# Patient Record
Sex: MALE | Race: WHITE
[De-identification: names, ages, dates, MRNs, and addresses within clinical notes are randomized per-mention and may not be internally consistent; named-entity substitution may affect disease eponyms.]

---

## 2017-11-24 ENCOUNTER — HOSPITAL ENCOUNTER (OUTPATIENT)
Dept: HOSPITAL 47 - EC | Age: 52
Setting detail: OBSERVATION
Discharge: LEFT BEFORE DISCHARGE | End: 2017-11-24
Payer: COMMERCIAL

## 2017-11-24 VITALS — DIASTOLIC BLOOD PRESSURE: 57 MMHG | SYSTOLIC BLOOD PRESSURE: 121 MMHG | HEART RATE: 78 BPM | TEMPERATURE: 97.4 F

## 2017-11-24 VITALS — RESPIRATION RATE: 18 BRPM

## 2017-11-24 DIAGNOSIS — E66.9: ICD-10-CM

## 2017-11-24 DIAGNOSIS — Z93.3: ICD-10-CM

## 2017-11-24 DIAGNOSIS — E11.9: ICD-10-CM

## 2017-11-24 DIAGNOSIS — R53.1: ICD-10-CM

## 2017-11-24 DIAGNOSIS — Z79.899: ICD-10-CM

## 2017-11-24 DIAGNOSIS — E78.00: ICD-10-CM

## 2017-11-24 DIAGNOSIS — R07.89: Primary | ICD-10-CM

## 2017-11-24 DIAGNOSIS — Z88.0: ICD-10-CM

## 2017-11-24 DIAGNOSIS — Z79.84: ICD-10-CM

## 2017-11-24 DIAGNOSIS — Z88.5: ICD-10-CM

## 2017-11-24 DIAGNOSIS — F32.9: ICD-10-CM

## 2017-11-24 DIAGNOSIS — Z79.4: ICD-10-CM

## 2017-11-24 DIAGNOSIS — Z85.048: ICD-10-CM

## 2017-11-24 DIAGNOSIS — Z53.21: ICD-10-CM

## 2017-11-24 DIAGNOSIS — F41.9: ICD-10-CM

## 2017-11-24 DIAGNOSIS — R06.02: ICD-10-CM

## 2017-11-24 LAB
ALP SERPL-CCNC: 95 U/L (ref 38–126)
ALT SERPL-CCNC: 49 U/L (ref 21–72)
ANION GAP SERPL CALC-SCNC: 11 MMOL/L
APTT BLD: 23.4 SEC (ref 22–30)
AST SERPL-CCNC: 19 U/L (ref 17–59)
BASOPHILS # BLD AUTO: 0.1 K/UL (ref 0–0.2)
BASOPHILS NFR BLD AUTO: 1 %
BUN SERPL-SCNC: 14 MG/DL (ref 9–20)
CALCIUM SPEC-MCNC: 8.9 MG/DL (ref 8.4–10.2)
CH: 29.1
CHCM: 33.9
CHLORIDE SERPL-SCNC: 103 MMOL/L (ref 98–107)
CK SERPL-CCNC: 48 U/L (ref 55–170)
CO2 SERPL-SCNC: 24 MMOL/L (ref 22–30)
EOSINOPHIL # BLD AUTO: 0.2 K/UL (ref 0–0.7)
EOSINOPHIL NFR BLD AUTO: 3 %
ERYTHROCYTE [DISTWIDTH] IN BLOOD BY AUTOMATED COUNT: 5.9 M/UL (ref 4.3–5.9)
ERYTHROCYTE [DISTWIDTH] IN BLOOD: 14 % (ref 11.5–15.5)
GLUCOSE BLD-MCNC: 184 MG/DL (ref 75–99)
GLUCOSE SERPL-MCNC: 230 MG/DL (ref 74–99)
HCT VFR BLD AUTO: 51 % (ref 39–53)
HDW: 2.81
HGB BLD-MCNC: 17.2 GM/DL (ref 13–17.5)
INR PPP: 1 (ref ?–1.2)
LUC NFR BLD AUTO: 1 %
LYMPHOCYTES # SPEC AUTO: 1.6 K/UL (ref 1–4.8)
LYMPHOCYTES NFR SPEC AUTO: 21 %
MAGNESIUM SPEC-SCNC: 2.2 MG/DL (ref 1.6–2.3)
MCH RBC QN AUTO: 29.1 PG (ref 25–35)
MCHC RBC AUTO-ENTMCNC: 33.7 G/DL (ref 31–37)
MCV RBC AUTO: 86.4 FL (ref 80–100)
MONOCYTES # BLD AUTO: 0.4 K/UL (ref 0–1)
MONOCYTES NFR BLD AUTO: 5 %
NEUTROPHILS # BLD AUTO: 5.2 K/UL (ref 1.3–7.7)
NEUTROPHILS NFR BLD AUTO: 69 %
NON-AFRICAN AMERICAN GFR(MDRD): >60
POTASSIUM SERPL-SCNC: 4.5 MMOL/L (ref 3.5–5.1)
PROT SERPL-MCNC: 7.3 G/DL (ref 6.3–8.2)
PT BLD: 10.2 SEC (ref 9–12)
SODIUM SERPL-SCNC: 138 MMOL/L (ref 137–145)
TROPONIN I SERPL-MCNC: <0.012 NG/ML (ref 0–0.03)
WBC # BLD AUTO: 0.1 10*3/UL
WBC # BLD AUTO: 7.6 K/UL (ref 3.8–10.6)
WBC (PEROX): 7.47

## 2017-11-24 PROCEDURE — 80053 COMPREHEN METABOLIC PANEL: CPT

## 2017-11-24 PROCEDURE — 93005 ELECTROCARDIOGRAM TRACING: CPT

## 2017-11-24 PROCEDURE — 83735 ASSAY OF MAGNESIUM: CPT

## 2017-11-24 PROCEDURE — 85610 PROTHROMBIN TIME: CPT

## 2017-11-24 PROCEDURE — 71020: CPT

## 2017-11-24 PROCEDURE — 85025 COMPLETE CBC W/AUTO DIFF WBC: CPT

## 2017-11-24 PROCEDURE — 96374 THER/PROPH/DIAG INJ IV PUSH: CPT

## 2017-11-24 PROCEDURE — 99291 CRITICAL CARE FIRST HOUR: CPT

## 2017-11-24 PROCEDURE — 84484 ASSAY OF TROPONIN QUANT: CPT

## 2017-11-24 PROCEDURE — 82550 ASSAY OF CK (CPK): CPT

## 2017-11-24 PROCEDURE — 85730 THROMBOPLASTIN TIME PARTIAL: CPT

## 2017-11-24 PROCEDURE — 82553 CREATINE MB FRACTION: CPT

## 2017-11-24 PROCEDURE — 36415 COLL VENOUS BLD VENIPUNCTURE: CPT

## 2017-11-24 NOTE — XR
EXAMINATION TYPE: XR chest 2V

 

DATE OF EXAM: 11/24/2017

 

COMPARISON: NONE

 

HISTORY: Chest pain for 3 days and abnormal EKG.

 

TECHNIQUE:  Frontal and lateral views of the chest are obtained.

 

FINDINGS:  There is no focal air space opacity, pleural effusion, or pneumothorax seen.  The cardiac 
silhouette size is upper limits of normal size. The osseous structures are intact. Mild multilevel de
generative changes of the thoracic spine are noted.

 

IMPRESSION:  No acute cardiopulmonary process.

## 2017-11-24 NOTE — ED
General Adult HPI





- General


Chief complaint: Chest Pain


Stated complaint: chest pain/sent by doctor


Time Seen by Provider: 11/24/17 12:08


Source: patient, RN notes reviewed, old records reviewed


Mode of arrival: wheelchair


Limitations: no limitations





- History of Present Illness


Initial comments: 





This is a 52-year-old male to the ER for evaluation regarding chest pain.  

Patient having anterior chest pain chest pain the left-sided chest pain the 

left jaw.  Patient has history of diabetes high cholesterol.  No prior history 

of heart disease.  Patient coming in with continued chest pain or shortness of 

breath at this time, weakness fatigue.  Worse with activity.  No recent travel 

history no sick contacts no fevers cough or congestion





- Related Data


 Home Medications











 Medication  Instructions  Recorded  Confirmed


 


Atorvastatin [Lipitor] 10 mg PO HS 10/22/14 11/24/17


 


Insulin Glulisine [Apidra] 20 unit SQ TID 10/22/14 11/24/17


 


Losartan [Cozaar] 50 mg PO DAILY 10/22/14 11/24/17


 


Dapagliflozin Propanediol [Farxiga] 10 mg PO DAILY 11/24/17 11/24/17


 


Escitalopram [Lexapro] 10 mg PO DAILY 11/24/17 11/24/17


 


HYDROcodone/APAP 7.5-325MG [Norco 1 tab PO Q6H PRN 11/24/17 11/24/17





7.5-325]   


 


Insulin Glargine,Hum.rec.anlog 110 units SQ DAILY 11/24/17 11/24/17





[Toujeo Solostar]   











 Allergies











Allergy/AdvReac Type Severity Reaction Status Date / Time


 


meperidine HCl [From Demerol] Allergy Unknown Nausea & Verified 11/24/17 13:05





   Vomiting  


 


Penicillins Allergy Unknown Unknown Verified 11/24/17 13:05





   Childhood  














Review of Systems


ROS Statement: 


Those systems with pertinent positive or pertinent negative responses have been 

documented in the HPI.





ROS Other: All systems not noted in ROS Statement are negative.





Past Medical History


Past Medical History: Cancer, Diabetes Mellitus, Hyperlipidemia


Additional Past Medical History / Comment(s): HX OF RECTAL CA(AGE 28), HAS 

COLOSTOMY, WHICH HE STATES IS HERNIATED. nerve damage to legs


History of Any Multi-Drug Resistant Organisms: None Reported


Past Surgical History: Back Surgery, Bowel Resection, Orthopedic Surgery


Additional Past Surgical History / Comment(s): DAREN KNEE, RT ROTATOR CUFF, 

COLOSTOMY (2007), REPAIR OF HERNIATED COLOSTOMY (2013)


Past Anesthesia/Blood Transfusion Reactions: Postoperative Nausea & Vomiting (

PONV)


Past Psychological History: Anxiety, Depression


Smoking Status: Never smoker


Past Alcohol Use History: None Reported


Past Drug Use History: None Reported





General Exam


Limitations: no limitations


General appearance: alert, in no apparent distress, obese


Head exam: Present: atraumatic, normocephalic, normal inspection


Eye exam: Present: normal appearance, PERRL, EOMI.  Absent: scleral icterus, 

conjunctival injection, periorbital swelling


ENT exam: Present: normal exam, mucous membranes moist


Neck exam: Present: normal inspection.  Absent: tenderness, meningismus, 

lymphadenopathy


Respiratory exam: Present: normal lung sounds bilaterally.  Absent: respiratory 

distress, wheezes, rales, rhonchi, stridor


Cardiovascular Exam: Present: regular rate, normal rhythm, normal heart sounds.

  Absent: systolic murmur, diastolic murmur, rubs, gallop, clicks


GI/Abdominal exam: Present: soft, normal bowel sounds.  Absent: distended, 

tenderness, guarding, rebound, rigid


Extremities exam: Present: normal inspection, full ROM, normal capillary 

refill.  Absent: tenderness, pedal edema, joint swelling, calf tenderness


Back exam: Present: normal inspection


Neurological exam: Present: alert, oriented X3, CN II-XII intact


Psychiatric exam: Present: normal affect, normal mood


Skin exam: Present: warm, dry, intact, normal color.  Absent: rash





Course


 Vital Signs











  11/24/17 11/24/17





  11:58 13:23


 


Temperature 97.1 F L 


 


Pulse Rate 75 73


 


Respiratory 18 17





Rate  


 


Blood Pressure 139/83 124/72


 


O2 Sat by Pulse 99 96





Oximetry  














EKG Findings





- EKG Comments:


EKG Findings:: EKG shows normal sinus rhythm rate of 71, , , QTc 

447





Medical Decision Making





- Medical Decision Making





52 male to ER for evaluation of chest pain.  Patient will be admitted for chest 

pain observation, anticoagulation, cardiac evaluation





- Lab Data


Result diagrams: 


 11/24/17 12:15





 11/24/17 12:15


 Lab Results











  11/24/17 11/24/17 11/24/17 Range/Units





  12:15 12:15 12:15 


 


WBC   7.6   (3.8-10.6)  k/uL


 


RBC   5.90   (4.30-5.90)  m/uL


 


Hgb   17.2   (13.0-17.5)  gm/dL


 


Hct   51.0   (39.0-53.0)  %


 


MCV   86.4   (80.0-100.0)  fL


 


MCH   29.1   (25.0-35.0)  pg


 


MCHC   33.7   (31.0-37.0)  g/dL


 


RDW   14.0   (11.5-15.5)  %


 


Plt Count   218   (150-450)  k/uL


 


Neutrophils %   69   %


 


Lymphocytes %   21   %


 


Monocytes %   5   %


 


Eosinophils %   3   %


 


Basophils %   1   %


 


Neutrophils #   5.2   (1.3-7.7)  k/uL


 


Lymphocytes #   1.6   (1.0-4.8)  k/uL


 


Monocytes #   0.4   (0-1.0)  k/uL


 


Eosinophils #   0.2   (0-0.7)  k/uL


 


Basophils #   0.1   (0-0.2)  k/uL


 


PT     (9.0-12.0)  sec


 


INR     (<1.2)  


 


APTT     (22.0-30.0)  sec


 


Sodium    138  (137-145)  mmol/L


 


Potassium    4.5  (3.5-5.1)  mmol/L


 


Chloride    103  ()  mmol/L


 


Carbon Dioxide    24  (22-30)  mmol/L


 


Anion Gap    11  mmol/L


 


BUN    14  (9-20)  mg/dL


 


Creatinine    0.80  (0.66-1.25)  mg/dL


 


Est GFR (MDRD) Af Amer    >60  (>60 ml/min/1.73 sqM)  


 


Est GFR (MDRD) Non-Af    >60  (>60 ml/min/1.73 sqM)  


 


Glucose    230 H  (74-99)  mg/dL


 


Calcium    8.9  (8.4-10.2)  mg/dL


 


Magnesium    2.2  (1.6-2.3)  mg/dL


 


Total Bilirubin    0.7  (0.2-1.3)  mg/dL


 


AST    19  (17-59)  U/L


 


ALT    49  (21-72)  U/L


 


Alkaline Phosphatase    95  ()  U/L


 


Total Creatine Kinase  48 L    ()  U/L


 


CK-MB (CK-2)  0.5    (0.0-2.4)  ng/mL


 


CK-MB (CK-2) Rel Index  1.0    


 


Troponin I  <0.012    (0.000-0.034)  ng/mL


 


Total Protein    7.3  (6.3-8.2)  g/dL


 


Albumin    4.2  (3.5-5.0)  g/dL














  11/24/17 Range/Units





  12:15 


 


WBC   (3.8-10.6)  k/uL


 


RBC   (4.30-5.90)  m/uL


 


Hgb   (13.0-17.5)  gm/dL


 


Hct   (39.0-53.0)  %


 


MCV   (80.0-100.0)  fL


 


MCH   (25.0-35.0)  pg


 


MCHC   (31.0-37.0)  g/dL


 


RDW   (11.5-15.5)  %


 


Plt Count   (150-450)  k/uL


 


Neutrophils %   %


 


Lymphocytes %   %


 


Monocytes %   %


 


Eosinophils %   %


 


Basophils %   %


 


Neutrophils #   (1.3-7.7)  k/uL


 


Lymphocytes #   (1.0-4.8)  k/uL


 


Monocytes #   (0-1.0)  k/uL


 


Eosinophils #   (0-0.7)  k/uL


 


Basophils #   (0-0.2)  k/uL


 


PT  10.2  (9.0-12.0)  sec


 


INR  1.0  (<1.2)  


 


APTT  23.4  (22.0-30.0)  sec


 


Sodium   (137-145)  mmol/L


 


Potassium   (3.5-5.1)  mmol/L


 


Chloride   ()  mmol/L


 


Carbon Dioxide   (22-30)  mmol/L


 


Anion Gap   mmol/L


 


BUN   (9-20)  mg/dL


 


Creatinine   (0.66-1.25)  mg/dL


 


Est GFR (MDRD) Af Amer   (>60 ml/min/1.73 sqM)  


 


Est GFR (MDRD) Non-Af   (>60 ml/min/1.73 sqM)  


 


Glucose   (74-99)  mg/dL


 


Calcium   (8.4-10.2)  mg/dL


 


Magnesium   (1.6-2.3)  mg/dL


 


Total Bilirubin   (0.2-1.3)  mg/dL


 


AST   (17-59)  U/L


 


ALT   (21-72)  U/L


 


Alkaline Phosphatase   ()  U/L


 


Total Creatine Kinase   ()  U/L


 


CK-MB (CK-2)   (0.0-2.4)  ng/mL


 


CK-MB (CK-2) Rel Index   


 


Troponin I   (0.000-0.034)  ng/mL


 


Total Protein   (6.3-8.2)  g/dL


 


Albumin   (3.5-5.0)  g/dL














- Radiology Data


Radiology results: report reviewed (Chest x-ray is negative for acute disease), 

image reviewed





Critical Care Time


Critical Care Time: Yes


Total Critical Care Time: 31





Disposition


Clinical Impression: 


 Chest pain





Disposition: ADMITTED AS IP TO THIS HOSP


Condition: Undetermined


Instructions:  Chest Pain (ED)


Referrals: 


German Chaparro DO [Primary Care Provider] - 1-2 days

## 2017-11-25 NOTE — HP
HISTORY AND PHYSICAL



CHIEF COMPLAINT:

Chest pain.



HISTORY OF PRESENT ILLNESS:

This 52-year-old male was admitted through the emergency room to the floor.  Shortly

after he got the floor, he signed out against medical advice.  There is no further

history taken or physical performed.



He is admitted to the hospital with diagnosis of chest pain.



PLAN:

Bed rest, IVs and serial EKGs and enzymes.  He signed out AMA.





FAUSTINA / JUSTINE: [id number] / Job#: [id number]

## 2017-11-26 NOTE — DS
DISCHARGE SUMMARY



CHIEF COMPLAINT:

Chest pain.



HISTORY OF PRESENT ILLNESS AND PHYSICAL EXAM.:

There was no history taken or physical performed.  Patient signed out against medical

advice.



FINAL DIAGNOSIS:

Chest pain.



OPERATIONS:

None.



CONSULTATION:

None. He signed out AMA.





FAUSTINA / JUSTINE: [id number] / Job#: [id number]

## 2018-01-06 ENCOUNTER — HOSPITAL ENCOUNTER (EMERGENCY)
Dept: HOSPITAL 47 - EC | Age: 53
Discharge: HOME | End: 2018-01-06
Payer: MEDICARE

## 2018-01-06 VITALS
RESPIRATION RATE: 20 BRPM | TEMPERATURE: 97.8 F | SYSTOLIC BLOOD PRESSURE: 150 MMHG | HEART RATE: 78 BPM | DIASTOLIC BLOOD PRESSURE: 73 MMHG

## 2018-01-06 DIAGNOSIS — F32.9: ICD-10-CM
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
## 2018-01-06 DIAGNOSIS — Z79.82: ICD-10-CM

## 2018-01-06 DIAGNOSIS — Z79.4: ICD-10-CM

## 2018-01-06 DIAGNOSIS — M19.90: ICD-10-CM

## 2018-01-06 DIAGNOSIS — Z85.048: ICD-10-CM

## 2018-01-06 DIAGNOSIS — Z79.899: ICD-10-CM

## 2018-01-06 DIAGNOSIS — E11.9: ICD-10-CM

## 2018-01-06 DIAGNOSIS — Z88.0: ICD-10-CM

## 2018-01-06 DIAGNOSIS — F41.9: ICD-10-CM
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
## 2018-01-06 DIAGNOSIS — Z88.5: ICD-10-CM

## 2018-01-06 DIAGNOSIS — S30.810A: Primary | ICD-10-CM

## 2018-01-06 DIAGNOSIS — I10: ICD-10-CM

## 2018-01-06 PROCEDURE — 99283 EMERGENCY DEPT VISIT LOW MDM: CPT

## 2018-01-06 NOTE — ED
Back Pain HPI





- General


Chief Complaint: Back Pain/Injury


Stated Complaint: FALL


Time Seen by Provider: 18 19:53


Source: patient


Limitations: no limitations





- History of Present Illness


Initial Comments: 


Patient is a 52-year-old male who presents with a chief complaint of fall and 

back injury.  [facility] he [facility] on 

the escalator.  The patient states that when he was going down the escalator, 

his right arm caught the railing on the stairs that were next to the escalator 

and lifted him up and he fell.  The patient states that he scratched his back 

on the edges of the escalator step.  The patient was immediately evaluated by 

medical staff at the [facility].  After that, the patient continued to [place] and 

later decided to come to the emergency department in [place] as he lives 

here.  On initial evaluation, the patient states that he just wanted to get 

checked out.  He does not have any spinal tenderness, his gait is at baseline.








- Related Data


 Home Medications











 Medication  Instructions  Recorded  Confirmed


 


Atorvastatin [Lipitor] 10 mg PO DAILY 10/22/14 01/06/18


 


Insulin Glulisine [Apidra] 20 unit SQ TID 10/22/14 01/06/18


 


Losartan [Cozaar] 50 mg PO HS 10/22/14 01/06/18


 


Escitalopram [Lexapro] 10 mg PO HS 17


 


Insulin Glargine,Hum.rec.anlog 110 units SQ DAILY 17





[Toujeo Solostar]   


 


Aspirin EC [Ecotrin Low Dose] 81 mg PO DAILY 18


 


Empagliflozin [Jardiance] 10 mg PO DAILY 18


 


Insulin Aspart [NovoLOG 20 unit SQ AC-TID 18





(formulary)]   


 


Insulin Aspart [NovoLOG See Protocol SQ AC-TID 18





(formulary)]   











 Allergies











Allergy/AdvReac Type Severity Reaction Status Date / Time


 


meperidine HCl [From Demerol] Allergy Unknown Nausea & Verified 18 19:57





   Vomiting  


 


Penicillins Allergy Unknown Unknown Verified 18 19:57





   Childhood  














Review of Systems


ROS Statement: 


Those systems with pertinent positive or pertinent negative responses have been 

documented in the HPI.





ROS Other: All systems not noted in ROS Statement are negative.


Skin: Reports: lesions





Past Medical History


Past Medical History: Cancer, Diabetes Mellitus, Hypertension, Osteoarthritis (

OA), Pneumonia


Additional Past Medical History / Comment(s): Rectal cancer with surgery-

resection/colostomy and then stoma hernia repair-has current colostomy hernia, 

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 

IDDM type II, bronchitis, arthritis bilateral hands, occasional low back pain, 

past R hand fracture.


History of Any Multi-Drug Resistant Organisms: None Reported


Past Surgical History: Back Surgery, Bowel Resection, Orthopedic Surgery


Additional Past Surgical History / Comment(s):  AP resection,  resection

/colostomy,  repair stoma hernia, large cyst removed from low back, R 

rotator cuff surgery, multiple colonoscopies, R knee arthroscopy, L hydocele/

small tumor removed, fatty tumors removed from back, 2 benign tumors removed 

from R groin.


Past Anesthesia/Blood Transfusion Reactions: Postoperative Nausea & Vomiting (

PONV)


Past Psychological History: Anxiety, Depression


Smoking Status: Never smoker


Past Alcohol Use History: None Reported


Past Drug Use History: None Reported





- Past Family History


  ** Mother


Family Medical History: Myocardial Infarction (MI)


Additional Family Medical History / Comment(s): Pt states mother  at the 

age of 51 yrs from MI/aortic aneurysm rupture.





  ** Father


Family Medical History: No Reported History


Additional Family Medical History / Comment(s): Father is 78yrs old and healthy.





General Exam


Limitations: no limitations


General appearance: alert, in no apparent distress


Head exam: Present: atraumatic, normocephalic


Eye exam: Present: normal appearance


ENT exam: Present: mucous membranes moist


Neck exam: Absent: tenderness


Respiratory exam: Present: normal lung sounds bilaterally


Cardiovascular Exam: Present: regular rate, normal rhythm


GI/Abdominal exam: Present: soft


Extremities exam: Present: other (Patient has a very small abrasion to the 

right distal dorsal aspect of his calf).  Absent: pedal edema


Back exam: Present: normal inspection


Neurological exam: Present: alert, oriented X3


Psychiatric exam: Present: normal affect, normal mood


Skin exam: Present: abrasion (Patient has 4 linear abrasions to the right 

paraspinal aspect of his lower back.  There is no bleeding.)





Course





 Vital Signs











  18





  19:46


 


Temperature 97.8 F


 


Pulse Rate 78


 


Respiratory 20





Rate 


 


Blood Pressure 150/73


 


O2 Sat by Pulse 98





Oximetry 














Medical Decision Making





- Medical Decision Making


Patient presents with a chief complaint of a fall on an escalator.  The patient 

states that he scratched his skin on his back on the escalator steps.  He is up-

to-date on his tetanus.  Patient is able to gait normally.  He is not having 

significant pain.  He was offered x-rays but declines as he would not like to 

be exposed to radiation because he has had colon cancer before.  At this time, 

the patient will have antibiotic ointment applied to the area will be 

discharged.  He was instructed to follow-up with primary care or return to the 

emergency department if symptoms worsen or change.








Disposition


Clinical Impression: 


 Abrasion





Disposition: HOME SELF-CARE


Condition: Good


Instructions:  Abrasion (ED)


Referrals: 


German Chaparro DO [Primary Care Provider] - 1-2 days

## 2018-08-23 ENCOUNTER — HOSPITAL ENCOUNTER (EMERGENCY)
Dept: HOSPITAL 47 - EC | Age: 53
Discharge: HOME | End: 2018-08-23
Payer: COMMERCIAL

## 2018-08-23 VITALS
TEMPERATURE: 98 F | DIASTOLIC BLOOD PRESSURE: 70 MMHG | RESPIRATION RATE: 18 BRPM | SYSTOLIC BLOOD PRESSURE: 120 MMHG | HEART RATE: 70 BPM

## 2018-08-23 DIAGNOSIS — Z85.048: ICD-10-CM

## 2018-08-23 DIAGNOSIS — I10: ICD-10-CM

## 2018-08-23 DIAGNOSIS — M25.531: Primary | ICD-10-CM

## 2018-08-23 DIAGNOSIS — Z79.4: ICD-10-CM

## 2018-08-23 DIAGNOSIS — F32.9: ICD-10-CM

## 2018-08-23 DIAGNOSIS — M19.041: ICD-10-CM

## 2018-08-23 DIAGNOSIS — E11.9: ICD-10-CM

## 2018-08-23 DIAGNOSIS — Z88.0: ICD-10-CM
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
## 2018-08-23 DIAGNOSIS — Z88.5: ICD-10-CM

## 2018-08-23 DIAGNOSIS — M79.644: ICD-10-CM

## 2018-08-23 DIAGNOSIS — Z79.899: ICD-10-CM

## 2018-08-23 DIAGNOSIS — M79.89: ICD-10-CM

## 2018-08-23 DIAGNOSIS — Z93.3: ICD-10-CM

## 2018-08-23 DIAGNOSIS — F41.9: ICD-10-CM

## 2018-08-23 DIAGNOSIS — M19.042: ICD-10-CM

## 2018-08-23 DIAGNOSIS — Z98.890: ICD-10-CM

## 2018-08-23 DIAGNOSIS — Z79.82: ICD-10-CM

## 2018-08-23 PROCEDURE — 29125 APPL SHORT ARM SPLINT STATIC: CPT

## 2018-08-23 PROCEDURE — 99283 EMERGENCY DEPT VISIT LOW MDM: CPT

## 2018-08-23 NOTE — ED
Upper Extremity HPI





- General


Chief Complaint: Extremity Injury, Upper


Stated Complaint: right hand pain


Time Seen by Provider: 18 15:19


Source: patient, RN notes reviewed


Mode of arrival: ambulatory


Limitations: no limitations





- History of Present Illness


Initial Comments: 


This is a 53-year-old male who presents to the emergency department with chief 

complaint of right hand injury.  Patient reports falling yesterday on an 

outstretched hand.  He reports pain to the joint of his thumb and down into the 

proximal hand.  States he has difficulty flexing the finger.  Reports normal 

sensation.  Denies any other injuries or trauma. Denies fever, chills, chest 

pain, shortness of breath, numbness or tingling, headache or vision changes.  








- Related Data


 Home Medications











 Medication  Instructions  Recorded  Confirmed


 


Atorvastatin [Lipitor] 10 mg PO DAILY 10/22/14 01/06/18


 


Insulin Glulisine [Apidra] 20 unit SQ TID 10/22/14 01/06/18


 


Losartan [Cozaar] 50 mg PO HS 10/22/14 01/06/18


 


Escitalopram [Lexapro] 10 mg PO HS 17


 


Insulin Glargine,Hum.rec.anlog 110 units SQ DAILY 17





[Toujeo Solostar]   


 


Aspirin EC [Ecotrin Low Dose] 81 mg PO DAILY 18


 


Empagliflozin [Jardiance] 10 mg PO DAILY 18


 


Insulin Aspart [NovoLOG 20 unit SQ AC-TID 18





(formulary)]   


 


Insulin Aspart [NovoLOG See Protocol SQ AC-TID 18





(formulary)]   











 Allergies











Allergy/AdvReac Type Severity Reaction Status Date / Time


 


meperidine HCl [From Demerol] Allergy Unknown Nausea & Verified 18 15:49





   Vomiting  


 


Penicillins Allergy Unknown Unknown Verified 18 15:49





   Childhood  














Review of Systems


ROS Statement: 


Those systems with pertinent positive or pertinent negative responses have been 

documented in the HPI.





ROS Other: All systems not noted in ROS Statement are negative.





Past Medical History


Past Medical History: Cancer, Diabetes Mellitus, Hypertension, Osteoarthritis (

OA), Pneumonia


Additional Past Medical History / Comment(s): Rectal cancer with surgery-

resection/colostomy and then stoma hernia repair-has current colostomy hernia, 

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 

IDDM type II, bronchitis, arthritis bilateral hands, occasional low back pain, 

past R hand fracture.


History of Any Multi-Drug Resistant Organisms: None Reported


Past Surgical History: Back Surgery, Bowel Resection, Orthopedic Surgery


Additional Past Surgical History / Comment(s):  AP resection,  resection

/colostomy,  repair stoma hernia, large cyst removed from low back, R 

rotator cuff surgery, multiple colonoscopies, R knee arthroscopy, L hydocele/

small tumor removed, fatty tumors removed from back, 2 benign tumors removed 

from R groin.


Past Anesthesia/Blood Transfusion Reactions: Postoperative Nausea & Vomiting (

PONV)


Past Psychological History: Anxiety, Depression


Smoking Status: Never smoker


Past Alcohol Use History: None Reported


Past Drug Use History: None Reported





- Past Family History


  ** Mother


Family Medical History: Myocardial Infarction (MI)


Additional Family Medical History / Comment(s): Pt states mother  at the 

age of 51 yrs from MI/aortic aneurysm rupture.





  ** Father


Family Medical History: No Reported History


Additional Family Medical History / Comment(s): Father is 78yrs old and healthy.





General Exam





- General Exam Comments


Initial Comments: 





General: Awake and alert, well-developed; in no apparent distress.


HEENT: Head atraumatic, normocephalic. Pupils are equal, round and reactive to 

light. Extraocular movements intact. Oropharynx moist without erythema or 

exudate. 


Neck: Supple. Normal ROM. 


Cardiovascular: Regular rate and rhythm. No murmurs, rubs or gallops. Chest 

symmetrical.  


Respiratory: Lungs clear to auscultation bilaterally. No wheezes, rales or 

rhonchi. Normal respiratory effort with no use of accessory muscles. 


Musculoskeletal: Limited active range of motion of the IP and MCP joints of the 

right thumb.  Passive range of motion is intact.  Tenderness on palpation of 

the MCP joint and anatomic snuffbox.  Soft tissue swelling along radial aspect 

right wrist. No ecchymosis, erythema noted.  Sensation is intact.  Radial 

pulses are 2+ equal and palpable bilaterally.  Ambulating with a cane.


Skin: Pink, warm and dry without rashes or lesions. 


Neurological: Alert and oriented x3. CN II-XII grossly intact. Speech is fluent 

and answers are appropriate. No focal neuro deficits. 


Psychiatric: Normal mood and affect. No overt signs of depression or anxiety 

noted. 











Limitations: no limitations





Course


 Vital Signs











  18





  13:54 15:38


 


Temperature 98.4 F 98 F


 


Pulse Rate 74 70


 


Respiratory 16 18





Rate  


 


Blood Pressure 142/74 120/70


 


O2 Sat by Pulse 96 98





Oximetry  














Procedures





- Orthopedic Splinting/Casting


  ** Injury #1


Side: right


Upper Extremity Injury Location: wrist


Upper Extremity Immobilizer: thumb spica, synthetic pre-padded splint





Medical Decision Making





- Medical Decision Making


This is a 53-year-old male who presents to the emergency department with chief 

complaint of right hand injury.  Patient reports falling on outstretched hand 

yesterday.  On physical examination, patient has limited active range of motion 

of the right thumb joints due to pain.  There is anatomic snuffbox tenderness 

and tenderness at the MCP joint.  Short arm OCL thumb spica splint was placed 

and patient tolerated well without complication. He is neurovascularly intact.  

Recommended following up with orthopedics for repeat x-rays.  Recommended rest, 

ice and Tylenol or ibuprofen as needed.  Patient is in agreement with plan and 

voices understanding.  Vital signs are stable and he is in no acute distress.  

He will be discharged with this time.  All questions answered.








- Radiology Data


Radiology results: report reviewed, image reviewed





X-ray right hand findings: Bone mineralization and joint spaces are maintained.

  There is some volar angulation of the fifth metacarpal, cortical thickening 

is present suggestive of old healed boxer's fracture. Remodeling present at the 

radiocarpal joint likely due to underlying arthropathy.  Question bony density 

at the proximal aspect of the first metacarpal seen on the lateral exam, 

correlate for point tenderness.  There is soft tissue swelling.


Impression: No dislocation.  Findings at the fifth and first metacarpals as 

described.  Correlate for point tenderness.





Disposition


Clinical Impression: 


 Right wrist pain





Disposition: HOME SELF-CARE


Condition: Good


Instructions:  Wrist Injury (ED)


Additional Instructions: 


Please follow up with [facility] within 1-2 days.  Please keep 

splint clean, dry and intact.  Please rest, ice and take ibuprofen or Tylenol 

as needed for pain. Please follow up with primary care provider within 1-2 

days. Return to emergency department if symptoms should worsen or any concerns 

arise. 


Is patient prescribed a controlled substance at d/c from ED?: No


Referrals: 


German Chaparro DO [Primary Care Provider] - 1-2 days


Rafy Dumont MD [Medical Doctor] - 1-2 days


Time of Disposition: 15:50

## 2018-08-23 NOTE — XR
Right hand

 

HISTORY: Right hand pain, trauma

 

3 views of the right hand

 

Bone mineralization and joint spaces are maintained. There is some volar angulation of the fifth meta
carpal, cortical thickening is present suggestive of old healed boxer's fracture. Remodeling present 
at the radiocarpal joint likely due to underlying arthropathy. Question bony density at the proximal 
aspect of the first metacarpal seen on the lateral exam, correlate for point tenderness. There is sof
t tissue swelling.

 

IMPRESSION: No dislocation. Findings at the first and fifth metacarpals as described. Correlate for p
oint tenderness.

## 2019-07-07 ENCOUNTER — HOSPITAL ENCOUNTER (EMERGENCY)
Dept: HOSPITAL 47 - EC | Age: 54
Discharge: HOME | End: 2019-07-07
Payer: COMMERCIAL

## 2019-07-07 VITALS
TEMPERATURE: 98 F | RESPIRATION RATE: 18 BRPM | HEART RATE: 74 BPM | DIASTOLIC BLOOD PRESSURE: 87 MMHG | SYSTOLIC BLOOD PRESSURE: 144 MMHG

## 2019-07-07 DIAGNOSIS — Z85.048: ICD-10-CM

## 2019-07-07 DIAGNOSIS — Z88.5: ICD-10-CM

## 2019-07-07 DIAGNOSIS — Z93.3: ICD-10-CM

## 2019-07-07 DIAGNOSIS — I10: ICD-10-CM

## 2019-07-07 DIAGNOSIS — F41.9: ICD-10-CM
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
## 2019-07-07 DIAGNOSIS — E11.9: ICD-10-CM

## 2019-07-07 DIAGNOSIS — Z79.4: ICD-10-CM

## 2019-07-07 DIAGNOSIS — Z53.20: ICD-10-CM
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
## 2019-07-07 DIAGNOSIS — Z79.899: ICD-10-CM

## 2019-07-07 DIAGNOSIS — F32.9: ICD-10-CM

## 2019-07-07 DIAGNOSIS — Z88.0: ICD-10-CM

## 2019-07-07 DIAGNOSIS — S20.211A: Primary | ICD-10-CM

## 2019-07-07 PROCEDURE — 99283 EMERGENCY DEPT VISIT LOW MDM: CPT

## 2019-07-07 NOTE — ED
Fall HPI





- General


Chief Complaint: Fall


Stated Complaint: Fall


Time Seen by Provider: 19 15:04


Source: patient, RN notes reviewed


Mode of arrival: ambulatory


Limitations: no limitations





- History of Present Illness


Initial Comments: 





54-year-old male presents emergency Department chief complaint of a fall.  

Patient states that he is out of back of a chair trying to lift it up to get 

onto the tongue of the truck.  Patient states it started rolling he fell off 

onto his right side of his chest.  He has no headache denies any head injury no 

loss conscious.  Denies any extremity injury.  Patient states he only has right 

anterior rib pain.  Patient has pain with deep inspiration.  Patient does not 

feel short of breath at rest denies any nausea vomiting.  Patient denies any 

other complaints.  Patient does not request any pain meds.





- Related Data


                                Home Medications











 Medication  Instructions  Recorded  Confirmed


 


Atorvastatin [Lipitor] 10 mg PO DAILY 10/22/14 08/23/18


 


Insulin Glulisine [Apidra] 20 unit SQ TID 10/22/14 08/23/18


 


Losartan [Cozaar] 50 mg PO HS 10/22/14 08/23/18


 


Escitalopram [Lexapro] 10 mg PO HS 17


 


Insulin Glargine,Hum.rec.anlog 110 units SQ DAILY 17





[Toujeo Solostar]   


 


Acetaminophen [Tylenol] 325 mg PO Q4H PRN 18











                                    Allergies











Allergy/AdvReac Type Severity Reaction Status Date / Time


 


meperidine HCl [From Demerol] Allergy Unknown Nausea & Verified 19 15:01





   Vomiting  


 


Penicillins Allergy Unknown Unknown Verified 19 15:01





   Childhood  














Review of Systems


ROS Statement: 


Those systems with pertinent positive or pertinent negative responses have been 

documented in the HPI.





ROS Other: All systems not noted in ROS Statement are negative.





Past Medical History


Past Medical History: Cancer, Diabetes Mellitus, Hypertension, Osteoarthritis 

(OA), Pneumonia


Additional Past Medical History / Comment(s): Rectal cancer with surgery-

resection/colostomy and then stoma hernia repair-has current colostomy hernia, 

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

type II, bronchitis, arthritis bilateral hands, occasional low back pain, past R

hand fracture.


History of Any Multi-Drug Resistant Organisms: None Reported


Past Surgical History: Back Surgery, Bowel Resection, Orthopedic Surgery


Additional Past Surgical History / Comment(s):  AP resection,  

resection/colostomy, 2013 repair stoma hernia, large cyst removed from low back,

R rotator cuff surgery, multiple colonoscopies, R knee arthroscopy, L 

hydocele/small tumor removed, fatty tumors removed from back, 2 benign tumors 

removed from R groin.


Past Anesthesia/Blood Transfusion Reactions: Postoperative Nausea & Vomiting 

(PONV)


Past Psychological History: Anxiety, Depression


Smoking Status: Never smoker


Past Alcohol Use History: None Reported


Past Drug Use History: None Reported





- Past Family History


  ** Mother


Family Medical History: Myocardial Infarction (MI)


Additional Family Medical History / Comment(s): Pt states mother  at the age

of 51 yrs from MI/aortic aneurysm rupture.





  ** Father


Family Medical History: No Reported History


Additional Family Medical History / Comment(s): Father is 78yrs old and healthy.





General Exam


Limitations: no limitations


General appearance: alert, in no apparent distress


Head exam: Present: atraumatic, normocephalic, normal inspection


Eye exam: Present: normal appearance, PERRL, EOMI.  Absent: scleral icterus, 

conjunctival injection, periorbital swelling


ENT exam: Present: normal exam, normal oropharynx, mucous membranes moist, TM's 

normal bilaterally


Neck exam: Present: normal inspection, full ROM.  Absent: tenderness, 

meningismus, lymphadenopathy


Respiratory exam: Present: normal lung sounds bilaterally, chest wall tenderness

(Moderate right anterior to lateral rib tenderness small area of erythema no 

ecchymosis).  Absent: respiratory distress, wheezes, rales, rhonchi, stridor


Cardiovascular Exam: Present: regular rate, normal rhythm, normal heart sounds. 

Absent: systolic murmur, diastolic murmur, rubs, gallop, clicks


GI/Abdominal exam: Present: soft, normal bowel sounds.  Absent: distended, 

tenderness, guarding, rebound, rigid


Extremities exam: Present: normal inspection, full ROM, normal capillary refill.

 Absent: tenderness, pedal edema, joint swelling, calf tenderness


Neurological exam: Present: alert, oriented X3, CN II-XII intact, reflexes 

normal.  Absent: motor sensory deficit


Skin exam: Present: warm, dry, intact, normal color.  Absent: rash





Course


                                   Vital Signs











  19





  14:58


 


Temperature 98.0 F


 


Pulse Rate 74


 


Respiratory 18





Rate 


 


Blood Pressure 144/87


 


O2 Sat by Pulse 99





Oximetry 














Medical Decision Making





- Medical Decision Making


54-year-old male present emergency Department for fall, right chest wall injury.

 Rib series and PA chest were obtained no acute abnormality.  Patient does not 

have a pneumothorax no rib fracture identified.  Patient was offered pain meds 

and declined.








Disposition


Clinical Impression: 


 Fall, Contusion of rib on right side





Disposition: HOME SELF-CARE


Condition: Stable


Instructions (If sedation given, give patient instructions):  Rib Contusion (ED)


Additional Instructions: 


Please return to the Emergency Department if symptoms worsen or any other 

concerns.


Is patient prescribed a controlled substance at d/c from ED?: No


Referrals: 


German Chaparro DO [Primary Care Provider] - 1-2 days


Time of Disposition: 15:38

## 2019-07-07 NOTE — XR
EXAMINATION TYPE: XR ribs RT w pa chest xray

 

DATE OF EXAM: 7/7/2019

 

COMPARISON: Chest x-ray 11/24/2017

 

HISTORY: Right rib pain. Injury.

 

TECHNIQUE: 5 views including a chest x-ray

 

FINDINGS: Heart and mediastinum are normal. Lungs are clear. There is no pleural effusion. Bony thora
x is intact. There is no evidence of pneumothorax. The right ribs appear intact.

 

IMPRESSION: Normal chest. Normal right ribs. No change.

## 2019-12-08 ENCOUNTER — HOSPITAL ENCOUNTER (EMERGENCY)
Dept: HOSPITAL 47 - EC | Age: 54
Discharge: HOME | End: 2019-12-08
Payer: COMMERCIAL

## 2019-12-08 VITALS
DIASTOLIC BLOOD PRESSURE: 70 MMHG | HEART RATE: 72 BPM | TEMPERATURE: 97.9 F | RESPIRATION RATE: 20 BRPM | SYSTOLIC BLOOD PRESSURE: 125 MMHG

## 2019-12-08 DIAGNOSIS — Z79.899: ICD-10-CM

## 2019-12-08 DIAGNOSIS — F41.9: ICD-10-CM

## 2019-12-08 DIAGNOSIS — E11.9: ICD-10-CM

## 2019-12-08 DIAGNOSIS — Z88.0: ICD-10-CM

## 2019-12-08 DIAGNOSIS — I10: ICD-10-CM

## 2019-12-08 DIAGNOSIS — S93.402A: Primary | ICD-10-CM

## 2019-12-08 DIAGNOSIS — Z88.5: ICD-10-CM
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
## 2019-12-08 DIAGNOSIS — Z79.4: ICD-10-CM

## 2019-12-08 DIAGNOSIS — F32.9: ICD-10-CM

## 2019-12-08 PROCEDURE — 99283 EMERGENCY DEPT VISIT LOW MDM: CPT

## 2019-12-08 PROCEDURE — 73610 X-RAY EXAM OF ANKLE: CPT

## 2019-12-08 PROCEDURE — 29515 APPLICATION SHORT LEG SPLINT: CPT

## 2019-12-08 NOTE — ED
Lower Extremity Injury HPI





- General


Chief Complaint: Extremity Injury, Lower


Stated Complaint: L Ankle Injury


Time Seen by Provider: 19 01:21


Source: patient, family


Mode of arrival: ambulatory


Limitations: no limitations





- History of Present Illness


Initial Comments: 





Patient is a 54-year-old male presenting to emergency Department with a chief c

omplaint of ankle pain.  Patient reports about 9 hours ago he was walking when 

he rolled his left ankle.  Patient reports initially there was minimal pain or 

swelling so he continued about doing his daily activities.  He states he woke up

around midnight tonight with a severe onset of pain along the lateral aspect of 

the left ankle.  Patient reports pain with inversion and plantarflexion.  

Patient also reports some swelling in the region but denies any skin 

discoloration.  Patient denies any numbness or tingling.  Patient does not take 

any medication to alleviate the symptoms.  





- Related Data


                                Home Medications











 Medication  Instructions  Recorded  Confirmed


 


Atorvastatin [Lipitor] 10 mg PO DAILY 10/22/14 08/23/18


 


Insulin Glulisine [Apidra] 20 unit SQ TID 10/22/14 08/23/18


 


Losartan [Cozaar] 50 mg PO HS 10/22/14 08/23/18


 


Escitalopram [Lexapro] 10 mg PO HS 17


 


Insulin Glargine,Hum.rec.anlog 110 units SQ DAILY 17





[Toujeo Solostar]   


 


Acetaminophen [Tylenol] 325 mg PO Q4H PRN 18











                                    Allergies











Allergy/AdvReac Type Severity Reaction Status Date / Time


 


meperidine HCl [From Demerol] Allergy Unknown Nausea & Verified 19 01:11





   Vomiting  


 


Penicillins Allergy Unknown Unknown Verified 19 01:11





   Childhood  














Review of Systems


ROS Statement: 


Those systems with pertinent positive or pertinent negative responses have been 

documented in the HPI.





ROS Other: All systems not noted in ROS Statement are negative.





Past Medical History


Past Medical History: Cancer, Diabetes Mellitus, Hypertension, Osteoarthritis 

(OA), Pneumonia


Additional Past Medical History / Comment(s): Rectal cancer with surgery-

resection/colostomy and then stoma hernia repair-has current colostomy hernia, 

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

type II, bronchitis, arthritis bilateral hands, occasional low back pain, past R

hand fracture.


History of Any Multi-Drug Resistant Organisms: None Reported


Past Surgical History: Back Surgery, Bowel Resection, Orthopedic Surgery


Additional Past Surgical History / Comment(s):  AP resection,  

resection/colostomy, 2013 repair stoma hernia, large cyst removed from low back,

R rotator cuff surgery, multiple colonoscopies, R knee arthroscopy, L 

hydocele/small tumor removed, fatty tumors removed from back, 2 benign tumors 

removed from R groin.


Past Anesthesia/Blood Transfusion Reactions: Postoperative Nausea & Vomiting 

(PONV)


Past Psychological History: Anxiety, Depression


Smoking Status: Never smoker


Past Alcohol Use History: None Reported


Past Drug Use History: None Reported





- Past Family History


  ** Mother


Family Medical History: Myocardial Infarction (MI)


Additional Family Medical History / Comment(s): Pt states mother  at the age

of 51 yrs from MI/aortic aneurysm rupture.





  ** Father


Family Medical History: No Reported History


Additional Family Medical History / Comment(s): Father is 78yrs old and healthy.





General Exam


Limitations: no limitations


General appearance: alert, in no apparent distress


Head exam: Present: atraumatic, normocephalic, normal inspection


Eye exam: Present: normal appearance


Pupils: Present: normal accommodation


ENT exam: Present: normal exam, mucous membranes moist


Neck exam: Present: normal inspection, full ROM


Respiratory exam: Present: normal lung sounds bilaterally


Cardiovascular Exam: Present: regular rate, normal rhythm, normal heart sounds


Extremities exam: Present: tenderness (Tenderness along the lateral malleolus.),

normal capillary refill, joint swelling, other (+2 dorsalis pedis and posterior 

tibialis bilaterally.  Neuro exam unremarkable in the left lower leg.).  Absent:

normal inspection (Swelling along the lateral malleolus of the left ankle.  No 

skin discoloration.), full ROM (Limited range of motion with inversion and 

plantarflexion.), pedal edema, calf tenderness


Back exam: Present: normal inspection, full ROM


Neurological exam: Present: alert, oriented X3


Psychiatric exam: Present: normal affect, normal mood


Skin exam: Present: warm, dry, intact, normal color





Course





                                   Vital Signs











  19





  01:08


 


Temperature 97.9 F


 


Pulse Rate 72


 


Respiratory 20





Rate 


 


Blood Pressure 125/70


 


O2 Sat by Pulse 97





Oximetry 














Medical Decision Making





- Medical Decision Making





Patient is 54-year-old male presenting to the emergency department with chief 

complaint of left ankle pain.  On exam there appears to be some tenderness along

the left lateral malleolus.  Pain with inversion and plantarflexion.  X-ray 

shows soft tissue swelling but no fractures.  Patient offered analgesia but he 

declined.  Ankle stirrup applied.  Patient advised to follow with orthopedics.  

Strict return parameters were thoroughly discussed the patient was understanding

and agreeable.  Patient advised to apply ice compress to minimize symptoms.  

Patient advised to alternate between Tylenol and ibuprofen for pain control.  

Case discussed with physician.





Disposition


Clinical Impression: 


 Left ankle sprain





Disposition: HOME SELF-CARE


Condition: Stable


Instructions (If sedation given, give patient instructions):  Ankle Sprain (ED)


Additional Instructions: 


Please avoid weightbearing and apply ice compress to minimize symptoms.  

Alternate between Tylenol and ibuprofen for pain control.  Please follow with 

orthopedics.


Is patient prescribed a controlled substance at d/c from ED?: No


Referrals: 


German Chaparro DO [Primary Care Provider] - 1-2 days


Colby Chaney DO [Doctor of Osteopathic Medicine] - 1-2 days


Time of Disposition: 01:38

## 2019-12-08 NOTE — XR
EXAMINATION TYPE: XR ankle complete LT

 

DATE OF EXAM: 12/8/2019

 

COMPARISON: NONE

 

HISTORY: Ankle pain

 

TECHNIQUE: 3 views

 

FINDINGS: Ankle mortise is anatomic. There is mild soft tissue swelling over the lateral malleolus. I
 see no fracture nor dislocation. Joint spaces are fairly normal.

 

IMPRESSION: Soft tissue swelling. No fracture seen.

## 2020-12-06 ENCOUNTER — HOSPITAL ENCOUNTER (EMERGENCY)
Dept: HOSPITAL 47 - EC | Age: 55
Discharge: HOME | End: 2020-12-06
Payer: COMMERCIAL

## 2020-12-06 VITALS
TEMPERATURE: 98.6 F | DIASTOLIC BLOOD PRESSURE: 75 MMHG | RESPIRATION RATE: 18 BRPM | HEART RATE: 90 BPM | SYSTOLIC BLOOD PRESSURE: 142 MMHG

## 2020-12-06 DIAGNOSIS — Z88.0: ICD-10-CM

## 2020-12-06 DIAGNOSIS — Z85.048: ICD-10-CM

## 2020-12-06 DIAGNOSIS — L50.0: Primary | ICD-10-CM

## 2020-12-06 DIAGNOSIS — Z79.4: ICD-10-CM

## 2020-12-06 DIAGNOSIS — Z88.5: ICD-10-CM

## 2020-12-06 DIAGNOSIS — Z79.899: ICD-10-CM

## 2020-12-06 DIAGNOSIS — F41.9: ICD-10-CM

## 2020-12-06 DIAGNOSIS — E11.9: ICD-10-CM

## 2020-12-06 DIAGNOSIS — F32.9: ICD-10-CM

## 2020-12-06 DIAGNOSIS — I10: ICD-10-CM

## 2020-12-06 PROCEDURE — 99282 EMERGENCY DEPT VISIT SF MDM: CPT

## 2020-12-06 NOTE — ED
General Adult HPI





- General


Chief complaint: Skin/Abscess/Foreign Body


Stated complaint: Rash, covid +


Time Seen by Provider: 20 09:14


Source: patient, RN notes reviewed


Mode of arrival: ambulatory


Limitations: no limitations





- History of Present Illness


Initial comments: 





This a 55-year-old male presents emergency department tingling or rash.  Patient

states has been present for last foot few days.  Patient states that it's very 

itchy when he takes Benadryl he states symptoms to go away.  Patient states that

the new he can think of is a new leather furniture.  Patient states he has no 

difficulty breathing or difficulty swallowing.  He denies any new products soaps

lotions detergents.  He does add that he was positive for covid last week.  

Patient states that he is unsure if it is related to it.  Patient is a diabetic 

and which she is on insulin.  Patient states that he's had no changes of his 

meds.





- Related Data


                                Home Medications











 Medication  Instructions  Recorded  Confirmed


 


Atorvastatin [Lipitor] 10 mg PO DAILY 10/22/14 08/23/18


 


Insulin Glulisine [Apidra] 20 unit SQ TID 10/22/14 08/23/18


 


Losartan [Cozaar] 50 mg PO HS 10/22/14 08/23/18


 


Escitalopram [Lexapro] 10 mg PO HS 17


 


Insulin Glargine,Hum.rec.anlog 110 units SQ DAILY 17





[Toujeo Solostar]   


 


Acetaminophen [Tylenol] 325 mg PO Q4H PRN 18








                                  Previous Rx's











 Medication  Instructions  Recorded


 


Famotidine [Pepcid] 20 mg PO BID #28 tablet 20











                                    Allergies











Allergy/AdvReac Type Severity Reaction Status Date / Time


 


meperidine HCl [From Demerol] Allergy Unknown Nausea & Verified 20 09:09





   Vomiting  


 


Penicillins Allergy Unknown Unknown Verified 20 09:09





   Childhood  














Review of Systems


ROS Statement: 


Those systems with pertinent positive or pertinent negative responses have been 

documented in the HPI.





ROS Other: All systems not noted in ROS Statement are negative.





Past Medical History


Past Medical History: Cancer, Diabetes Mellitus, Hypertension, Osteoarthritis 

(OA), Pneumonia


Additional Past Medical History / Comment(s): Rectal cancer with 

surgery-resection/colostomy and then stoma hernia repair-has current colostomy 

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 

surgery, IDDM type II, bronchitis, arthritis bilateral hands, occasional low 

back pain, past R hand fracture.


History of Any Multi-Drug Resistant Organisms: None Reported


Past Surgical History: Back Surgery, Bowel Resection, Orthopedic Surgery


Additional Past Surgical History / Comment(s):  AP resection,  

resection/colostomy,  repair stoma hernia, large cyst removed from low back,

R rotator cuff surgery, multiple colonoscopies, R knee arthroscopy, L 

hydocele/small tumor removed, fatty tumors removed from back, 2 benign tumors 

removed from R groin.


Past Anesthesia/Blood Transfusion Reactions: Postoperative Nausea & Vomiting 

(PONV)


Past Psychological History: Anxiety, Depression


Smoking Status: Never smoker


Past Alcohol Use History: None Reported


Past Drug Use History: None Reported





- Past Family History


  ** Mother


Family Medical History: Myocardial Infarction (MI)


Additional Family Medical History / Comment(s): Pt states mother  at the age

of 51 yrs from MI/aortic aneurysm rupture.





  ** Father


Family Medical History: No Reported History


Additional Family Medical History / Comment(s): Father is 78yrs old and healthy.





General Exam


Limitations: no limitations


General appearance: alert, in no apparent distress


Head exam: Present: atraumatic, normocephalic, normal inspection


Eye exam: Present: normal appearance, PERRL, EOMI.  Absent: scleral icterus, 

conjunctival injection, periorbital swelling


ENT exam: Present: normal exam, normal oropharynx, mucous membranes moist


Neck exam: Present: normal inspection.  Absent: tenderness, meningismus, ly

mphadenopathy


Respiratory exam: Present: normal lung sounds bilaterally.  Absent: respiratory 

distress, wheezes, rales, rhonchi, stridor


Cardiovascular Exam: Present: regular rate, normal rhythm, normal heart sounds. 

Absent: systolic murmur, diastolic murmur, rubs, gallop, clicks


GI/Abdominal exam: Present: soft, normal bowel sounds, other (Left lower 

colostomy noted).  Absent: distended, tenderness, guarding, rebound, rigid


Skin exam: Present: warm, dry, intact, normal color, rash (Blanchable slightly 

raised erythematous to salmon colored patches consistent with urticaria), 

urticaria





Course





                                   Vital Signs











  12/06/20





  09:07


 


Temperature 98.6 F


 


Pulse Rate 90


 


Respiratory 18





Rate 


 


Blood Pressure 142/75


 


O2 Sat by Pulse 96





Oximetry 














Medical Decision Making





- Medical Decision Making





Patient presented for rash.  Patient's symptoms have resolved with 

antihistamines.  Patient appears to have urticaria related to ALLERGIC reaction.

 Patient will be placed on steroids as is not in acute anaphylactic reaction and

he has known diabetic.  Patient will be placed on Pepcid and will continue 

Benadryl.





Disposition


Clinical Impression: 


 Urticaria, Allergic reaction





Disposition: HOME SELF-CARE


Condition: Stable


Instructions (If sedation given, give patient instructions):  Urticaria (ED)


Additional Instructions: 


Continue Benadryl 50 mg every 6 hours as directed.Please return to the Emergency

Department if symptoms worsen or any other concerns.


Prescriptions: 


Famotidine [Pepcid] 20 mg PO BID #28 tablet


Is patient prescribed a controlled substance at d/c from ED?: No


Referrals: 


German Chaparro DO [Primary Care Provider] - 1-2 days


Time of Disposition: 09:28